# Patient Record
Sex: FEMALE | URBAN - METROPOLITAN AREA
[De-identification: names, ages, dates, MRNs, and addresses within clinical notes are randomized per-mention and may not be internally consistent; named-entity substitution may affect disease eponyms.]

---

## 2022-07-01 ENCOUNTER — APPOINTMENT (RX ONLY)
Dept: URBAN - METROPOLITAN AREA CLINIC 124 | Facility: CLINIC | Age: 47
Setting detail: DERMATOLOGY
End: 2022-07-01

## 2022-07-01 DIAGNOSIS — L20.89 OTHER ATOPIC DERMATITIS: ICD-10-CM | Status: INADEQUATELY CONTROLLED

## 2022-07-01 PROBLEM — L20.84 INTRINSIC (ALLERGIC) ECZEMA: Status: ACTIVE | Noted: 2022-07-01

## 2022-07-01 PROCEDURE — ? PRESCRIPTION

## 2022-07-01 PROCEDURE — ? PATIENT SPECIFIC COUNSELING

## 2022-07-01 PROCEDURE — 99203 OFFICE O/P NEW LOW 30 MIN: CPT

## 2022-07-01 RX ORDER — HYDROCORTISONE 25 MG/G
OINTMENT TOPICAL
Qty: 28.35 | Refills: 0 | Status: ERX | COMMUNITY
Start: 2022-07-01

## 2022-07-01 RX ADMIN — HYDROCORTISONE: 25 OINTMENT TOPICAL at 00:00

## 2022-07-01 ASSESSMENT — LOCATION DETAILED DESCRIPTION DERM
LOCATION DETAILED: RIGHT UPPER CUTANEOUS LIP
LOCATION DETAILED: RIGHT SUPERIOR VERMILION LIP

## 2022-07-01 ASSESSMENT — LOCATION ZONE DERM: LOCATION ZONE: LIP

## 2022-07-01 ASSESSMENT — LOCATION SIMPLE DESCRIPTION DERM: LOCATION SIMPLE: RIGHT LIP

## 2022-07-01 NOTE — PROCEDURE: PATIENT SPECIFIC COUNSELING
Detail Level: Zone
Reviewed findings and contributing factors. Changes in skin with aging, dryness from uv exposure, dry lips from talking can all lead to dry peeling lips. Once lips are dry itâs not unusual for patients to constantly lick their lips to make them feel better and then irritate the upper cutaneous lip. \\nREcommend - use vaseline at night (generous amount). Use a tissue to wipe off vaseline and peeling skin, but donât tug and rip off peeling skin. Reapply vaseline and go to sleep. In am, okay to use current Suresh lip balm or aquaphor lip repair lip balm and then apply lip gloss. \\nFor inflamed part on cutaneous lip, use hydrocortisone 2.5% ointment bid x 5 days. Once lips are not dry, she shouldnât be licking as much and irritating this portion.

## 2022-07-01 NOTE — HPI: DRY SKIN
How Severe Is Your Dry Skin?: mild
Is This A New Presentation Or A Follow-Up?: Dry Skin
Additional History: Patient can not recall doing or taking anything that would cause the lips to peels and dry.

## 2023-09-09 ENCOUNTER — WEB ENCOUNTER (OUTPATIENT)
Dept: URBAN - METROPOLITAN AREA SURGERY CENTER 12 | Facility: SURGERY CENTER | Age: 48
End: 2023-09-09

## 2023-09-11 ENCOUNTER — CLAIMS CREATED FROM THE CLAIM WINDOW (OUTPATIENT)
Dept: URBAN - METROPOLITAN AREA SURGERY CENTER 12 | Facility: SURGERY CENTER | Age: 48
End: 2023-09-11

## 2023-09-11 ENCOUNTER — CLAIMS CREATED FROM THE CLAIM WINDOW (OUTPATIENT)
Dept: URBAN - METROPOLITAN AREA CLINIC 4 | Facility: CLINIC | Age: 48
End: 2023-09-11
Payer: COMMERCIAL

## 2023-09-11 ENCOUNTER — OFFICE VISIT (OUTPATIENT)
Dept: URBAN - METROPOLITAN AREA SURGERY CENTER 12 | Facility: SURGERY CENTER | Age: 48
End: 2023-09-11
Payer: COMMERCIAL

## 2023-09-11 ENCOUNTER — DASHBOARD ENCOUNTERS (OUTPATIENT)
Age: 48
End: 2023-09-11

## 2023-09-11 DIAGNOSIS — K64.8 OTHER HEMORRHOIDS: ICD-10-CM

## 2023-09-11 DIAGNOSIS — Z12.11 ENCOUNTER FOR SCREENING FOR MALIGNANT NEOPLASM OF COLON: ICD-10-CM

## 2023-09-11 DIAGNOSIS — K63.89 OTHER SPECIFIED DISEASES OF INTESTINE: ICD-10-CM

## 2023-09-11 DIAGNOSIS — K63.5 BENIGN COLON POLYP: ICD-10-CM

## 2023-09-11 DIAGNOSIS — K63.5 POLYP OF ASCENDING COLON, UNSPECIFIED TYPE: ICD-10-CM

## 2023-09-11 PROCEDURE — 88305 TISSUE EXAM BY PATHOLOGIST: CPT | Performed by: PATHOLOGY

## 2023-09-11 PROCEDURE — 00811 ANES LWR INTST NDSC NOS: CPT | Performed by: NURSE ANESTHETIST, CERTIFIED REGISTERED

## 2023-09-11 PROCEDURE — 45385 COLONOSCOPY W/LESION REMOVAL: CPT | Performed by: INTERNAL MEDICINE

## 2025-06-24 ENCOUNTER — NEW PATIENT (OUTPATIENT)
Age: 50
End: 2025-06-24

## 2025-06-24 DIAGNOSIS — H40.033: ICD-10-CM

## 2025-06-24 DIAGNOSIS — H25.13: ICD-10-CM

## 2025-06-24 DIAGNOSIS — H16.223: ICD-10-CM

## 2025-06-24 PROCEDURE — 99204 OFFICE O/P NEW MOD 45 MIN: CPT

## 2025-06-24 PROCEDURE — 92020 GONIOSCOPY: CPT

## 2025-06-24 PROCEDURE — 76514 ECHO EXAM OF EYE THICKNESS: CPT

## 2025-06-24 RX ORDER — PREDNISOLONE ACETATE 10 MG/ML: 1 SUSPENSION/ DROPS OPHTHALMIC

## 2025-07-07 ENCOUNTER — SURGERY/PROCEDURE (OUTPATIENT)
Age: 50
End: 2025-07-07

## 2025-07-07 DIAGNOSIS — H40.031: ICD-10-CM

## 2025-07-07 PROCEDURE — 66761 REVISION OF IRIS: CPT

## 2025-07-14 ENCOUNTER — SURGERY/PROCEDURE (OUTPATIENT)
Age: 50
End: 2025-07-14

## 2025-07-14 DIAGNOSIS — H40.032: ICD-10-CM

## 2025-07-14 PROCEDURE — 66761 REVISION OF IRIS: CPT | Mod: 79,LT

## 2025-08-06 ENCOUNTER — ADDENDUM (OUTPATIENT)
Age: 50
End: 2025-08-06

## 2025-08-11 ENCOUNTER — SURGERY/PROCEDURE (OUTPATIENT)
Age: 50
End: 2025-08-11

## 2025-08-11 DIAGNOSIS — H40.033: ICD-10-CM

## 2025-08-11 PROCEDURE — 66761TU: Mod: 50

## 2025-08-25 ENCOUNTER — FOLLOW UP (OUTPATIENT)
Age: 50
End: 2025-08-25

## 2025-08-25 DIAGNOSIS — H40.033: ICD-10-CM

## 2025-08-25 PROCEDURE — 99213 OFFICE O/P EST LOW 20 MIN: CPT

## 2025-08-25 PROCEDURE — 92020 GONIOSCOPY: CPT
